# Patient Record
Sex: FEMALE | Race: WHITE | Employment: STUDENT | URBAN - METROPOLITAN AREA
[De-identification: names, ages, dates, MRNs, and addresses within clinical notes are randomized per-mention and may not be internally consistent; named-entity substitution may affect disease eponyms.]

---

## 2020-02-24 ENCOUNTER — OFFICE VISIT (OUTPATIENT)
Dept: FAMILY MEDICINE CLINIC | Facility: CLINIC | Age: 20
End: 2020-02-24
Payer: COMMERCIAL

## 2020-02-24 VITALS
RESPIRATION RATE: 20 BRPM | SYSTOLIC BLOOD PRESSURE: 100 MMHG | BODY MASS INDEX: 21.1 KG/M2 | WEIGHT: 136 LBS | DIASTOLIC BLOOD PRESSURE: 60 MMHG | HEIGHT: 67.5 IN | OXYGEN SATURATION: 99 % | TEMPERATURE: 98 F | HEART RATE: 73 BPM

## 2020-02-24 DIAGNOSIS — Z00.00 ADULT GENERAL MEDICAL EXAMINATION: Primary | ICD-10-CM

## 2020-02-24 DIAGNOSIS — L65.9 HAIR THINNING: ICD-10-CM

## 2020-02-24 DIAGNOSIS — Z28.21 INFLUENZA VACCINATION DECLINED: ICD-10-CM

## 2020-02-24 DIAGNOSIS — B35.4 TINEA CORPORIS: ICD-10-CM

## 2020-02-24 PROCEDURE — 99385 PREV VISIT NEW AGE 18-39: CPT | Performed by: NURSE PRACTITIONER

## 2020-02-24 NOTE — PATIENT INSTRUCTIONS
-Start a daily multiviramin  -We will check your labs for an iron deficiency or thyroid abnormality that could explain why your hair is thinning.   -Sign up for MyChart so we can notify you easily of your lab results.  -Pap smears starting at age 24. Type 2 diabetes, prediabetes All women with no symptoms who are overweight or obese and have 1 or more other risk factors for diabetes At least every 3 years.  Also, testing for diabetes during pregnancy after the 24th week.    Type 2 diabetes, prediabetes Human papillomavirus (HPV) All women in this age group up to age 32 3 doses; the second dose should be given 1 to 2 months after the first dose and the third dose given 6 months after the first dose   Influenza (flu) All women in this age group Once a year 1 According to the ACS, women ages 21 to 44 years should have a clinical breast exam (CBE) as part of their routine health exam every 3 years. Breast self-exams are an option for women starting in their 25s.  But the USPSTF does not recommend CBE.   Date Las

## 2020-02-24 NOTE — PROGRESS NOTES
Chief Complaint:   Patient presents with:  Hair/Scalp Problem: Has noticed 2 months ago hair thining and shedding. No new products. Other: Ringworm on right wrist, was treated and would like to have it looked at.   Physical: no pap smer      HPI:   This is Past Medical History:   Diagnosis Date   • Ringworm     right wrist     History reviewed. No pertinent surgical history.   Social History:  Social History    Tobacco Use      Smoking status: Never Smoker      Smokeless tobacco: Never Used    Alc Resp 20   Ht 67.5\"   Wt 136 lb (61.7 kg)   LMP 02/12/2020   SpO2 99%   BMI 20.99 kg/m²  Estimated body mass index is 20.99 kg/m² as calculated from the following:    Height as of this encounter: 67.5\". Weight as of this encounter: 136 lb (61.7 kg). multivitamin OTC. To avoid hair dyes/procedures at this point. No concerning exam findings today.  -Will consider dermatology referral if no cause found on labs and hair loss continues. - IRON AND TIBC  - ASSAY, THYROID STIM HORMONE    3.  Tinea corporis